# Patient Record
Sex: MALE | Race: WHITE | Employment: OTHER | URBAN - METROPOLITAN AREA
[De-identification: names, ages, dates, MRNs, and addresses within clinical notes are randomized per-mention and may not be internally consistent; named-entity substitution may affect disease eponyms.]

---

## 2020-05-21 DIAGNOSIS — Z11.59 ENCOUNTER FOR SCREENING FOR OTHER VIRAL DISEASES: Primary | ICD-10-CM

## 2020-06-27 DIAGNOSIS — Z11.59 ENCOUNTER FOR SCREENING FOR OTHER VIRAL DISEASES: ICD-10-CM

## 2020-06-27 PROCEDURE — U0003 INFECTIOUS AGENT DETECTION BY NUCLEIC ACID (DNA OR RNA); SEVERE ACUTE RESPIRATORY SYNDROME CORONAVIRUS 2 (SARS-COV-2) (CORONAVIRUS DISEASE [COVID-19]), AMPLIFIED PROBE TECHNIQUE, MAKING USE OF HIGH THROUGHPUT TECHNOLOGIES AS DESCRIBED BY CMS-2020-01-R: HCPCS | Performed by: FAMILY MEDICINE

## 2020-06-28 LAB
SARS-COV-2 RNA SPEC QL NAA+PROBE: NOT DETECTED
SPECIMEN SOURCE: NORMAL

## 2020-06-30 ENCOUNTER — HOSPITAL ENCOUNTER (OUTPATIENT)
Facility: CLINIC | Age: 80
Discharge: HOME | End: 2020-06-30
Attending: COLON & RECTAL SURGERY | Admitting: COLON & RECTAL SURGERY
Payer: COMMERCIAL

## 2020-06-30 VITALS
BODY MASS INDEX: 29.73 KG/M2 | HEIGHT: 66 IN | HEART RATE: 54 BPM | OXYGEN SATURATION: 96 % | DIASTOLIC BLOOD PRESSURE: 79 MMHG | RESPIRATION RATE: 35 BRPM | TEMPERATURE: 98.3 F | SYSTOLIC BLOOD PRESSURE: 142 MMHG | WEIGHT: 185 LBS

## 2020-06-30 LAB — COLONOSCOPY: NORMAL

## 2020-06-30 PROCEDURE — 99153 MOD SED SAME PHYS/QHP EA: CPT | Performed by: COLON & RECTAL SURGERY

## 2020-06-30 PROCEDURE — 45385 COLONOSCOPY W/LESION REMOVAL: CPT | Performed by: COLON & RECTAL SURGERY

## 2020-06-30 PROCEDURE — G0500 MOD SEDAT ENDO SERVICE >5YRS: HCPCS | Performed by: COLON & RECTAL SURGERY

## 2020-06-30 PROCEDURE — 88305 TISSUE EXAM BY PATHOLOGIST: CPT | Mod: 26 | Performed by: COLON & RECTAL SURGERY

## 2020-06-30 PROCEDURE — 88305 TISSUE EXAM BY PATHOLOGIST: CPT | Performed by: COLON & RECTAL SURGERY

## 2020-06-30 PROCEDURE — 25000128 H RX IP 250 OP 636: Performed by: COLON & RECTAL SURGERY

## 2020-06-30 RX ORDER — PROCHLORPERAZINE MALEATE 5 MG
5 TABLET ORAL EVERY 6 HOURS PRN
Status: DISCONTINUED | OUTPATIENT
Start: 2020-06-30 | End: 2020-06-30 | Stop reason: HOSPADM

## 2020-06-30 RX ORDER — LOSARTAN POTASSIUM AND HYDROCHLOROTHIAZIDE 25; 100 MG/1; MG/1
1 TABLET ORAL DAILY
COMMUNITY

## 2020-06-30 RX ORDER — FENTANYL CITRATE 50 UG/ML
INJECTION, SOLUTION INTRAMUSCULAR; INTRAVENOUS PRN
Status: DISCONTINUED | OUTPATIENT
Start: 2020-06-30 | End: 2020-06-30 | Stop reason: HOSPADM

## 2020-06-30 RX ORDER — ONDANSETRON 2 MG/ML
4 INJECTION INTRAMUSCULAR; INTRAVENOUS
Status: DISCONTINUED | OUTPATIENT
Start: 2020-06-30 | End: 2020-06-30 | Stop reason: HOSPADM

## 2020-06-30 RX ORDER — ONDANSETRON 4 MG/1
4 TABLET, ORALLY DISINTEGRATING ORAL EVERY 6 HOURS PRN
Status: DISCONTINUED | OUTPATIENT
Start: 2020-06-30 | End: 2020-06-30 | Stop reason: HOSPADM

## 2020-06-30 RX ORDER — LIDOCAINE 40 MG/G
CREAM TOPICAL
Status: DISCONTINUED | OUTPATIENT
Start: 2020-06-30 | End: 2020-06-30 | Stop reason: HOSPADM

## 2020-06-30 RX ORDER — ONDANSETRON 2 MG/ML
4 INJECTION INTRAMUSCULAR; INTRAVENOUS EVERY 6 HOURS PRN
Status: DISCONTINUED | OUTPATIENT
Start: 2020-06-30 | End: 2020-06-30 | Stop reason: HOSPADM

## 2020-06-30 RX ORDER — CETIRIZINE HYDROCHLORIDE 10 MG/1
10 TABLET ORAL DAILY
COMMUNITY

## 2020-06-30 RX ORDER — ESCITALOPRAM OXALATE 10 MG/1
10 TABLET ORAL DAILY
COMMUNITY

## 2020-06-30 RX ORDER — NALOXONE HYDROCHLORIDE 0.4 MG/ML
.1-.4 INJECTION, SOLUTION INTRAMUSCULAR; INTRAVENOUS; SUBCUTANEOUS
Status: DISCONTINUED | OUTPATIENT
Start: 2020-06-30 | End: 2020-06-30 | Stop reason: HOSPADM

## 2020-06-30 RX ORDER — FLUMAZENIL 0.1 MG/ML
0.2 INJECTION, SOLUTION INTRAVENOUS
Status: DISCONTINUED | OUTPATIENT
Start: 2020-06-30 | End: 2020-06-30 | Stop reason: HOSPADM

## 2020-06-30 ASSESSMENT — MIFFLIN-ST. JEOR: SCORE: 1491.9

## 2020-06-30 NOTE — H&P
Pre-Endoscopy History and Physical   Les Chen MRN# [id number]   YOB: 1940 Age: 79 year old   Date of Procedure: 6/30/2020   Primary care provider: Peewee Ruffin   Type of Endoscopy: colonoscopy   Reason for Procedure: personal history of polyps   Type of Anesthesia Anticipated: Moderate Sedation   HPI:   Les is a 79 year old male with a personal history of polyps.  He last had a colonoscopy in 2015 where 2 tubular adenomas were removed from his cecum.  He denies BRBPR, abdominal pain, nausea/vomiting, changes in bowel habits or unintentional weight loss.    Allergies   Allergen Reactions     Ace Inhibitors Cough      Prior to Admission Medications   Prescriptions Last Dose Informant Patient Reported? Taking?   ACETAMINOPHEN PO   Yes No   ASPIRIN PO 6/23/2020  Yes No   Sig: Take 81 mg by mouth daily    AmLODIPine Besylate (NORVASC PO)   Yes No   IBUPROFEN PO   Yes No   Omega-3 Fatty Acids (OMEGA-3 FISH OIL PO)   Yes No   Oxybutynin Chloride (DITROPAN PO)   Yes No   POTASSIUM CITRATE PO   Yes No   Simvastatin (ZOCOR PO) 6/29/2020 at Unknown time  Yes Yes   Sig: Take 20 mg by mouth At Bedtime    VITAMIN D, CHOLECALCIFEROL, PO   Yes No   Sig: Take by mouth daily   ValACYclovir HCl (VALTREX PO) 6/29/2020 at Unknown time  Yes Yes   Sig: Take 500 mg by mouth daily    cetirizine (ZYRTEC) 10 MG tablet 6/29/2020 at Unknown time  Yes Yes   Sig: Take 10 mg by mouth daily   escitalopram (LEXAPRO) 10 MG tablet 6/29/2020 at Unknown time  Yes Yes   Sig: Take 10 mg by mouth daily   loratadine (CLARITIN) 10 MG tablet   Yes No   Sig: Take 10 mg by mouth daily   losartan-hydrochlorothiazide (HYZAAR) 100-25 MG tablet 6/29/2020 at Unknown time  Yes Yes   Sig: Take 1 tablet by mouth daily   multivitamin, therapeutic (THERA-VIT) TABS   Yes No   Sig: Take 1 tablet by mouth daily   vitamin  B complex with vitamin C (VITAMIN  B COMPLEX) TABS   Yes No   Sig: Take 1 tablet by mouth daily      Facility-Administered  "Medications: None      There is no problem list on file for this patient.     Past Medical History:   Diagnosis Date     Sleep apnea       No past surgical history on file.   Social History     Tobacco Use     Smoking status: Former Smoker   Substance Use Topics     Alcohol use: Yes     Comment: occ      No family history on file.   PHYSICAL EXAM:   Ht 1.676 m (5' 6\")   Wt 83.9 kg (185 lb)   BMI 29.86 kg/m   Estimated body mass index is 29.86 kg/m  as calculated from the following:    Height as of this encounter: 1.676 m (5' 6\").    Weight as of this encounter: 83.9 kg (185 lb).   RESP: lungs clear to auscultation - no rales, rhonchi or wheezes   CV: regular rates and rhythm   ASA Class 2 - Mild systemic disease    Assessment: 78 y/o gentleman with a personal history of polyps    Plan: Colonoscopy with moderate sedation.  Risks of the procedure were discussed including, but not limited to, bleeding, perforation and missed lesions.  Patient understands and is willing to proceed.    Mark Strong MD ....................  6/30/2020   8:29 AM  Colon and Rectal Surgery Staff  [phone]    "

## 2020-07-01 LAB — COPATH REPORT: NORMAL

## 2020-07-06 ENCOUNTER — DOCUMENTATION ONLY (OUTPATIENT)
Dept: OTHER | Facility: CLINIC | Age: 80
End: 2020-07-06

## 2020-11-05 ENCOUNTER — TRANSFERRED RECORDS (OUTPATIENT)
Dept: HEALTH INFORMATION MANAGEMENT | Facility: CLINIC | Age: 80
End: 2020-11-05

## 2020-12-06 ENCOUNTER — HEALTH MAINTENANCE LETTER (OUTPATIENT)
Age: 80
End: 2020-12-06

## 2021-05-04 ENCOUNTER — TRANSFERRED RECORDS (OUTPATIENT)
Dept: HEALTH INFORMATION MANAGEMENT | Facility: CLINIC | Age: 81
End: 2021-05-04

## 2021-06-28 ENCOUNTER — OFFICE VISIT (OUTPATIENT)
Dept: OPHTHALMOLOGY | Facility: CLINIC | Age: 81
End: 2021-06-28
Attending: OPHTHALMOLOGY
Payer: COMMERCIAL

## 2021-06-28 DIAGNOSIS — H02.886 MEIBOMIAN GLAND DYSFUNCTION (MGD) OF BOTH EYES: ICD-10-CM

## 2021-06-28 DIAGNOSIS — H26.9 CATARACTS, BILATERAL: ICD-10-CM

## 2021-06-28 DIAGNOSIS — H02.883 MEIBOMIAN GLAND DYSFUNCTION (MGD) OF BOTH EYES: ICD-10-CM

## 2021-06-28 DIAGNOSIS — H35.30 ARMD (AGE-RELATED MACULAR DEGENERATION), BILATERAL: ICD-10-CM

## 2021-06-28 DIAGNOSIS — H25.813 COMBINED FORM OF AGE-RELATED CATARACT, BOTH EYES: Primary | ICD-10-CM

## 2021-06-28 PROCEDURE — 76519 ECHO EXAM OF EYE: CPT | Performed by: OPHTHALMOLOGY

## 2021-06-28 PROCEDURE — 92134 CPTRZ OPH DX IMG PST SGM RTA: CPT | Performed by: OPHTHALMOLOGY

## 2021-06-28 PROCEDURE — G0463 HOSPITAL OUTPT CLINIC VISIT: HCPCS

## 2021-06-28 PROCEDURE — 92025 CPTRIZED CORNEAL TOPOGRAPHY: CPT | Performed by: OPHTHALMOLOGY

## 2021-06-28 PROCEDURE — 99204 OFFICE O/P NEW MOD 45 MIN: CPT | Mod: GC | Performed by: OPHTHALMOLOGY

## 2021-06-28 ASSESSMENT — TONOMETRY
IOP_METHOD: TONOPEN
OS_IOP_MMHG: 18
OD_IOP_MMHG: 19

## 2021-06-28 ASSESSMENT — REFRACTION_WEARINGRX
OS_AXIS: 002
OS_CYLINDER: +1.00
OD_ADD: +2.75
SPECS_TYPE: BIFOCAL
OD_CYLINDER: +1.50
OD_SPHERE: +1.25
OD_AXIS: 174
OS_ADD: +2.75
OS_SPHERE: +1.50

## 2021-06-28 ASSESSMENT — CONF VISUAL FIELD
METHOD: COUNTING FINGERS
OD_NORMAL: 1
OS_NORMAL: 1

## 2021-06-28 ASSESSMENT — VISUAL ACUITY
METHOD: SNELLEN - LINEAR
OD_CC+: +1
OD_BAT_MED: 20/70
OD_CC: 20/30
OS_CC+: +2
CORRECTION_TYPE: GLASSES
OS_BAT_LOW: 20/25+2
OD_BAT_HIGH: 20/200
OS_CC: 20/25
OS_BAT_HIGH: 20/60+2
OD_BAT_LOW: 20/50
OS_BAT_MED: 20/40+2

## 2021-06-28 ASSESSMENT — EXTERNAL EXAM - RIGHT EYE: OD_EXAM: NORMAL

## 2021-06-28 ASSESSMENT — EXTERNAL EXAM - LEFT EYE: OS_EXAM: NORMAL

## 2021-06-28 ASSESSMENT — SLIT LAMP EXAM - LIDS
COMMENTS: MGD
COMMENTS: MGD

## 2021-06-28 ASSESSMENT — CUP TO DISC RATIO
OD_RATIO: 0.2
OS_RATIO: 0.3

## 2021-06-28 NOTE — LETTER
6/28/2021       RE: Les Chen  [address] [place] [address]     Dear Colleague,    Thank you for referring your patient, Les Chen, to the [facility] at [facility]. Please see a copy of my visit note below.    CC:  Here for cataract evaluation    HPI:  81 y/o male with PMHx of HTN, HLD and POHx ?vitreous hemorrhage left eye, ARMD? refractive error presents for opinion regarding whether or not he should proceed with cataract surgery.      Patient states primary care doctor said it was time to get cataract surgery.  Notes he had a vitreous hemorrhage 11/2020 at saw a retina specialist at [place].  This doctor did not think he needed cataract surgery.      Patient here for evaluation to make an appropriate decision about proceeding with surgery.  He notes that driving and foggy days vision seems more cloudy right eye > left eye.      Notes [place] often without sun glasses.  Denies issues with glare.  Small print in magazines difficult.      Denies pain, redness, irritation, tearing, itchiness, photophobia, blurry vision, or trauma. No new flashes, stable floaters, diplopia.  No trauma. Patient states that he doesn't have a lot of glare. Patient feels like it is not as clear when it is cloudy. ++h/o Flomax    POHx:  VH? 11/2020  ARMD  Refractive Error     Last Eye Exam: ~1.5 months ago [facility]    Prior eye surgery/laser: no  Glasses: yes    FOHx:  none     Gtts:  ATs Qday and PRN  AREDS     All:  ACE inhibitors     A/P:    # Combined Form Cataracts each eye (Nuclear, cortical) , both eyes  - visually significant right eye > left eye  - 2+ NSC, 2+CSC  - BAT medium 20/70 right eye, 20/40+ left eye, high 20/200 right eye, 20/60+2 left eye   - R/B/A/possible complications of cataract surgery reviewed and patient would like to proceed with CEIOL - right eye first   - We discussed advanced technology  IOL's including multifocal and toric lenses. Would not pursue MFIOL given ARMD.  Minimal cyl -> monofocal lens.   - No trauma, dilates well,  ++h/o Flomax, no PXF material  - No fluoroquinolone allergy  - Target: distance   - Anesthesia: Mac/topical  - IOL calcs today. K addie with mild ATR astigmatism OD>OS, however on axis for temporal incision - okay to defer toric  - discussed risk of loss of vision, loss of the eye, pain, bleeding, infection, need for additional surgery, posterior capsular tear, and dropped nuclei.    # ARMD each eye  - per note pt brought from [place] (exam 11/2020) --> few dot heme isolated superior to disc, no evidence of CNV, start Amsler grid and AREDS.   - left eye > right eye   - has been following at [place]  - baseline [facility] Mac OCT today  - offered retina follow up at [facility] rather than [place] (pt is local)  - has been using AREDS BID --> continue.     #BORIS:  - left eye > right eye  - continue artificial tears daily / prn        Follow up: post-op day 1    --    Again, thank you for allowing me to participate in the care of your patient.      Sincerely,    Joesph Lyman MD

## 2021-06-28 NOTE — NURSING NOTE
Chief Complaints and History of Present Illnesses   Patient presents with     Cataract Evaluation     Chief Complaint(s) and History of Present Illness(es)     Cataract Evaluation               Comments     Pt states that vision is the same over the past month. Pt got new glasses, but still having difficulty with glare and halos around lights  Pt works on the computer and reads magazines and feels like it is more difficult.  Pt here today for cataract evaluation both eyes.  No eye pain today. No new flashes or floaters.    SOPHIE Cain June 28, 2021 12:19 PM

## 2021-06-28 NOTE — PROGRESS NOTES
CC:  Here for cataract evaluation    HPI:  79 y/o male with PMHx of HTN, HLD and POHx ?vitreous hemorrhage left eye, ARMD? refractive error presents for opinion regarding whether or not he should proceed with cataract surgery.      Patient states primary care doctor said it was time to get cataract surgery.  Notes he had a vitreous hemorrhage 11/2020 at saw a retina specialist at [place].  This doctor did not think he needed cataract surgery.      Patient here for evaluation to make an appropriate decision about proceeding with surgery.  He notes that driving and foggy days vision seems more cloudy right eye > left eye.      Notes [place] often without sun glasses.  Denies issues with glare.  Small print in magazines difficult.      Denies pain, redness, irritation, tearing, itchiness, photophobia, blurry vision, or trauma. No new flashes, stable floaters, diplopia.  No trauma. Patient states that he doesn't have a lot of glare. Patient feels like it is not as clear when it is cloudy. ++h/o Flomax    POHx:  VH? 11/2020  ARMD  Refractive Error     Last Eye Exam: ~1.5 months ago [facility]    Prior eye surgery/laser: no  Glasses: yes    FOHx:  none     Gtts:  ATs Qday and PRN  AREDS     All:  ACE inhibitors     A/P:    # Combined Form Cataracts each eye (Nuclear, cortical) , both eyes  - visually significant right eye > left eye  - 2+ NSC, 2+CSC  - BAT medium 20/70 right eye, 20/40+ left eye, high 20/200 right eye, 20/60+2 left eye   - R/B/A/possible complications of cataract surgery reviewed and patient would like to proceed with CEIOL - right eye first   - We discussed advanced technology IOL's including multifocal and toric lenses. Would not pursue MFIOL given ARMD.  Minimal cyl -> monofocal lens.   - No trauma, dilates well,  ++h/o Flomax, no PXF material  - No fluoroquinolone allergy  - Target: distance   - Anesthesia: Mac/topical  - IOL calcs today. K addie with mild ATR astigmatism OD>OS,  however on axis for temporal incision - okay to defer toric  - discussed multifocal lens - would defer.  - discussed risk of loss of vision, loss of the eye, pain, bleeding, infection, need for additional surgery, posterior capsular tear, and dropped nuclei.    # ARMD each eye  - per note pt brought from [place] (exam 11/2020) --> few dot heme isolated superior to disc, no evidence of CNV, start Amsler grid and AREDS.   - left eye > right eye   - has been following at [place]  - baseline [facility] Mac OCT today  - offered retina follow up at [facility] rather than [place] (pt is local)  - has been using AREDS BID --> continue.     #BORIS:  - left eye > right eye  - continue artificial tears daily / prn        Follow up: post-op day 1    --  Jason Goldberg, MD  Cornea & External Disease Fellow  Department of Ophthalmology and Visual Neurosciences    Attending Physician Attestation:  Complete documentation of historical and exam elements from today's encounter can be found in the full encounter summary report (not reduplicated in this progress note).  I personally obtained the chief complaint(s) and history of present illness.  I confirmed and edited as necessary the review of systems, past medical/surgical history, family history, social history, and examination findings as documented by others; and I examined the patient myself.  I personally reviewed the relevant tests, images, and reports as documented above.  I formulated and edited as necessary the assessment and plan and discussed the findings and management plan with the patient and family. I personally reviewed the ophthalmic test(s) associated with this encounter, agree with the interpretation(s) as documented by the resident/fellow, and have edited the corresponding report(s) as necessary. - Joesph Lyman MD    I personally spent great than 45min with the patient, of which >50% of the time was spent face to face with the patient, counseling and coordinating care with the patient.  We discussed the complexity of his diagnosis, the need for further information prior to proceeding with surgery, and the unknown prognosis for the patient at this time.    Joesph Lyman MD

## 2021-09-25 ENCOUNTER — HEALTH MAINTENANCE LETTER (OUTPATIENT)
Age: 81
End: 2021-09-25

## 2021-10-04 ENCOUNTER — TELEPHONE (OUTPATIENT)
Dept: OPHTHALMOLOGY | Facility: CLINIC | Age: 81
End: 2021-10-04

## 2021-10-04 NOTE — TELEPHONE ENCOUNTER
I called patient to schedule surgery with Dr. Joesph Lyman, I left a voicemail with callback # [phone]

## 2021-10-04 NOTE — TELEPHONE ENCOUNTER
Les called back to say he has decided to postpone. He originally was going to schedule for December but would like to wait until Spring, or later if he can.

## 2021-12-09 ENCOUNTER — TELEPHONE (OUTPATIENT)
Dept: OPHTHALMOLOGY | Facility: CLINIC | Age: 81
End: 2021-12-09
Payer: COMMERCIAL

## 2021-12-09 NOTE — TELEPHONE ENCOUNTER
Per our last conversation in October, Les had said he wanted to wait until the spring or later if he could. I reached out today to see if Les would like to schedule for the spring. Les says he has made arrangements for January with a provider elsewhere, closer to home. I let him know I would cancel his surgery orders so he doesn't receive anymore calls regarding this. Les wished everyone here a happy holiday and 2022.

## 2022-01-15 ENCOUNTER — HEALTH MAINTENANCE LETTER (OUTPATIENT)
Age: 82
End: 2022-01-15

## 2022-02-17 ENCOUNTER — TRANSCRIBE ORDERS (OUTPATIENT)
Dept: OTHER | Age: 82
End: 2022-02-17
Payer: COMMERCIAL

## 2022-02-17 DIAGNOSIS — I50.20 HEART FAILURE WITH REDUCED EJECTION FRACTION (H): Primary | ICD-10-CM

## 2023-01-07 ENCOUNTER — HEALTH MAINTENANCE LETTER (OUTPATIENT)
Age: 83
End: 2023-01-07

## 2024-02-10 ENCOUNTER — HEALTH MAINTENANCE LETTER (OUTPATIENT)
Age: 84
End: 2024-02-10

## (undated) DEVICE — SOL WATER IRRIG 1000ML BOTTLE 2F7114

## (undated) RX ORDER — FENTANYL CITRATE 50 UG/ML
INJECTION, SOLUTION INTRAMUSCULAR; INTRAVENOUS
Status: DISPENSED
Start: 2020-06-30